# Patient Record
Sex: MALE | Race: WHITE | HISPANIC OR LATINO | Employment: UNEMPLOYED | URBAN - METROPOLITAN AREA
[De-identification: names, ages, dates, MRNs, and addresses within clinical notes are randomized per-mention and may not be internally consistent; named-entity substitution may affect disease eponyms.]

---

## 2020-03-26 ENCOUNTER — HOSPITAL ENCOUNTER (INPATIENT)
Facility: CLINIC | Age: 35
LOS: 6 days | Discharge: HOME | DRG: 885 | End: 2020-04-01
Attending: PSYCHIATRY & NEUROLOGY | Admitting: PSYCHIATRY & NEUROLOGY
Payer: COMMERCIAL

## 2020-03-26 ENCOUNTER — HOSPITAL ENCOUNTER (EMERGENCY)
Facility: CLINIC | Age: 35
Discharge: ANOTHER HOSPITAL | End: 2020-03-26
Attending: PSYCHIATRY & NEUROLOGY | Admitting: PSYCHIATRY & NEUROLOGY
Payer: COMMERCIAL

## 2020-03-26 VITALS
DIASTOLIC BLOOD PRESSURE: 78 MMHG | WEIGHT: 180 LBS | RESPIRATION RATE: 16 BRPM | OXYGEN SATURATION: 98 % | TEMPERATURE: 98.2 F | SYSTOLIC BLOOD PRESSURE: 122 MMHG | HEART RATE: 88 BPM

## 2020-03-26 DIAGNOSIS — F32.2 CURRENT SEVERE EPISODE OF MAJOR DEPRESSIVE DISORDER WITHOUT PSYCHOTIC FEATURES, UNSPECIFIED WHETHER RECURRENT (H): ICD-10-CM

## 2020-03-26 DIAGNOSIS — F33.2 SEVERE EPISODE OF RECURRENT MAJOR DEPRESSIVE DISORDER, WITHOUT PSYCHOTIC FEATURES (H): Primary | ICD-10-CM

## 2020-03-26 PROBLEM — R45.851 SUICIDAL IDEATION: Status: ACTIVE | Noted: 2020-03-26

## 2020-03-26 LAB
AMPHETAMINES UR QL SCN: NEGATIVE
BARBITURATES UR QL: NEGATIVE
BENZODIAZ UR QL: NEGATIVE
CANNABINOIDS UR QL SCN: NEGATIVE
COCAINE UR QL: NEGATIVE
ERYTHROCYTE [DISTWIDTH] IN BLOOD BY AUTOMATED COUNT: 12.4 % (ref 10–15)
ETHANOL UR QL SCN: NEGATIVE
HCT VFR BLD AUTO: 43 % (ref 40–53)
HGB BLD-MCNC: 14.5 G/DL (ref 13.3–17.7)
MCH RBC QN AUTO: 30.2 PG (ref 26.5–33)
MCHC RBC AUTO-ENTMCNC: 33.7 G/DL (ref 31.5–36.5)
MCV RBC AUTO: 90 FL (ref 78–100)
OPIATES UR QL SCN: NEGATIVE
PLATELET # BLD AUTO: 280 10E9/L (ref 150–450)
RBC # BLD AUTO: 4.8 10E12/L (ref 4.4–5.9)
WBC # BLD AUTO: 8.2 10E9/L (ref 4–11)

## 2020-03-26 PROCEDURE — 25000132 ZZH RX MED GY IP 250 OP 250 PS 637: Performed by: PSYCHIATRY & NEUROLOGY

## 2020-03-26 PROCEDURE — 84443 ASSAY THYROID STIM HORMONE: CPT | Performed by: PSYCHIATRY & NEUROLOGY

## 2020-03-26 PROCEDURE — 80048 BASIC METABOLIC PNL TOTAL CA: CPT | Performed by: PSYCHIATRY & NEUROLOGY

## 2020-03-26 PROCEDURE — 85027 COMPLETE CBC AUTOMATED: CPT | Performed by: PSYCHIATRY & NEUROLOGY

## 2020-03-26 PROCEDURE — 12400000 ZZH R&B MH

## 2020-03-26 PROCEDURE — 99285 EMERGENCY DEPT VISIT HI MDM: CPT | Mod: 25 | Performed by: PSYCHIATRY & NEUROLOGY

## 2020-03-26 PROCEDURE — 90791 PSYCH DIAGNOSTIC EVALUATION: CPT

## 2020-03-26 PROCEDURE — 80320 DRUG SCREEN QUANTALCOHOLS: CPT | Performed by: PSYCHIATRY & NEUROLOGY

## 2020-03-26 PROCEDURE — 36415 COLL VENOUS BLD VENIPUNCTURE: CPT | Performed by: PSYCHIATRY & NEUROLOGY

## 2020-03-26 PROCEDURE — 80307 DRUG TEST PRSMV CHEM ANLYZR: CPT | Performed by: PSYCHIATRY & NEUROLOGY

## 2020-03-26 PROCEDURE — 99284 EMERGENCY DEPT VISIT MOD MDM: CPT | Mod: Z6 | Performed by: PSYCHIATRY & NEUROLOGY

## 2020-03-26 RX ORDER — ASPIRIN 81 MG/1
81 TABLET ORAL DAILY
COMMUNITY

## 2020-03-26 RX ORDER — CITALOPRAM HYDROBROMIDE 10 MG/1
10 TABLET ORAL DAILY
Status: ON HOLD | COMMUNITY
End: 2020-04-01

## 2020-03-26 RX ORDER — ASPIRIN 81 MG/1
81 TABLET ORAL DAILY
Status: DISCONTINUED | OUTPATIENT
Start: 2020-03-26 | End: 2020-04-01 | Stop reason: HOSPADM

## 2020-03-26 RX ORDER — ROSUVASTATIN CALCIUM 20 MG/1
20 TABLET, COATED ORAL DAILY
COMMUNITY

## 2020-03-26 RX ORDER — FINASTERIDE 1 MG/1
1 TABLET, FILM COATED ORAL DAILY
COMMUNITY

## 2020-03-26 RX ORDER — CITALOPRAM HYDROBROMIDE 20 MG/1
20 TABLET ORAL DAILY
Status: ON HOLD | COMMUNITY
End: 2020-04-01

## 2020-03-26 RX ORDER — ROSUVASTATIN CALCIUM 20 MG/1
20 TABLET, COATED ORAL DAILY
Status: DISCONTINUED | OUTPATIENT
Start: 2020-03-26 | End: 2020-04-01 | Stop reason: HOSPADM

## 2020-03-26 RX ORDER — CITALOPRAM HYDROBROMIDE 20 MG/1
20 TABLET ORAL DAILY
Status: DISCONTINUED | OUTPATIENT
Start: 2020-03-26 | End: 2020-03-27

## 2020-03-26 RX ORDER — CITALOPRAM HYDROBROMIDE 10 MG/1
10 TABLET ORAL DAILY
Status: DISCONTINUED | OUTPATIENT
Start: 2020-03-26 | End: 2020-03-27

## 2020-03-26 RX ORDER — HYDROXYZINE HYDROCHLORIDE 25 MG/1
25 TABLET, FILM COATED ORAL EVERY 4 HOURS PRN
Status: DISCONTINUED | OUTPATIENT
Start: 2020-03-26 | End: 2020-04-01 | Stop reason: HOSPADM

## 2020-03-26 RX ORDER — FINASTERIDE 1 MG/1
1 TABLET, FILM COATED ORAL DAILY
Status: DISCONTINUED | OUTPATIENT
Start: 2020-03-26 | End: 2020-04-01 | Stop reason: HOSPADM

## 2020-03-26 RX ADMIN — CITALOPRAM HYDROBROMIDE 20 MG: 20 TABLET ORAL at 23:47

## 2020-03-26 RX ADMIN — ROSUVASTATIN CALCIUM 20 MG: 20 TABLET, FILM COATED ORAL at 23:48

## 2020-03-26 RX ADMIN — ASPIRIN 81 MG: 81 TABLET, DELAYED RELEASE ORAL at 23:47

## 2020-03-26 RX ADMIN — CITALOPRAM HYDROBROMIDE 10 MG: 10 TABLET ORAL at 23:48

## 2020-03-26 RX ADMIN — HYDROXYZINE HYDROCHLORIDE 25 MG: 25 TABLET, FILM COATED ORAL at 23:51

## 2020-03-26 RX ADMIN — FINASTERIDE 1 MG: 1 TABLET, FILM COATED ORAL at 23:47

## 2020-03-26 RX ADMIN — BICTEGRAVIR SODIUM, EMTRICITABINE, AND TENOFOVIR ALAFENAMIDE FUMARATE 1 TABLET: 50; 200; 25 TABLET ORAL at 23:48

## 2020-03-26 ASSESSMENT — ENCOUNTER SYMPTOMS
NERVOUS/ANXIOUS: 1
SHORTNESS OF BREATH: 0
DYSPHORIC MOOD: 1
ABDOMINAL PAIN: 0
HALLUCINATIONS: 0
FEVER: 0

## 2020-03-26 ASSESSMENT — ACTIVITIES OF DAILY LIVING (ADL)
SWALLOWING: 0-->SWALLOWS FOODS/LIQUIDS WITHOUT DIFFICULTY
DRESS: 0-->INDEPENDENT
FALL_HISTORY_WITHIN_LAST_SIX_MONTHS: NO
AMBULATION: 0-->INDEPENDENT
BATHING: 0-->INDEPENDENT
RETIRED_EATING: 0-->INDEPENDENT
COGNITION: 0 - NO COGNITION ISSUES REPORTED
RETIRED_COMMUNICATION: 0-->UNDERSTANDS/COMMUNICATES WITHOUT DIFFICULTY
TOILETING: 0-->INDEPENDENT
TRANSFERRING: 0-->INDEPENDENT

## 2020-03-26 ASSESSMENT — MIFFLIN-ST. JEOR: SCORE: 1708.29

## 2020-03-26 NOTE — ED TRIAGE NOTES
"Patient reports multiple psychosocial factors that are contributing in increased SI. \"All of my safety nets are falling apart.\"   "

## 2020-03-26 NOTE — ED NOTES
"Patient presents to ED with c/o suicidal thoughts and states \"I want it to stop and be over; I am tired of suffering and being in pain\"; patient states suicidal thoughts started a couple of days ago and has gotten worst; patient denies alcohol and drug use; denies hallucination; patient is calm, cooperative and sitting up in chair.  "

## 2020-03-26 NOTE — ED NOTES
Chilo from [facility] called to inform ED he is sending pt here for eval. Pt had presented to [facility] today for c/o many stressors, feeling suicidal and needing support. Pt recently broke up with his partner, says he is not coping well, unable to contract for safety. Staff at [facility] say pt too acute for their program. Chilo is available by phone if needed: [phone]

## 2020-03-27 LAB
ANION GAP SERPL CALCULATED.3IONS-SCNC: 5 MMOL/L (ref 3–14)
BUN SERPL-MCNC: 20 MG/DL (ref 7–30)
CALCIUM SERPL-MCNC: 8.8 MG/DL (ref 8.5–10.1)
CHLORIDE SERPL-SCNC: 106 MMOL/L (ref 94–109)
CO2 SERPL-SCNC: 26 MMOL/L (ref 20–32)
CREAT SERPL-MCNC: 0.92 MG/DL (ref 0.66–1.25)
GFR SERPL CREATININE-BSD FRML MDRD: >90 ML/MIN/{1.73_M2}
GLUCOSE SERPL-MCNC: 92 MG/DL (ref 70–99)
POTASSIUM SERPL-SCNC: 3.5 MMOL/L (ref 3.4–5.3)
SODIUM SERPL-SCNC: 137 MMOL/L (ref 133–144)
TSH SERPL DL<=0.005 MIU/L-ACNC: 0.58 MU/L (ref 0.4–4)

## 2020-03-27 PROCEDURE — 99223 1ST HOSP IP/OBS HIGH 75: CPT | Performed by: PHYSICIAN ASSISTANT

## 2020-03-27 PROCEDURE — 12400000 ZZH R&B MH

## 2020-03-27 PROCEDURE — 25000132 ZZH RX MED GY IP 250 OP 250 PS 637: Performed by: PSYCHIATRY & NEUROLOGY

## 2020-03-27 RX ORDER — CITALOPRAM HYDROBROMIDE 20 MG/1
40 TABLET ORAL DAILY
Status: DISCONTINUED | OUTPATIENT
Start: 2020-03-28 | End: 2020-03-27

## 2020-03-27 RX ORDER — ARIPIPRAZOLE 2 MG/1
2 TABLET ORAL DAILY
Status: DISCONTINUED | OUTPATIENT
Start: 2020-03-27 | End: 2020-04-01 | Stop reason: HOSPADM

## 2020-03-27 RX ORDER — CITALOPRAM HYDROBROMIDE 20 MG/1
40 TABLET ORAL AT BEDTIME
Status: DISCONTINUED | OUTPATIENT
Start: 2020-03-27 | End: 2020-03-28

## 2020-03-27 RX ADMIN — ARIPIPRAZOLE 2 MG: 2 TABLET ORAL at 19:25

## 2020-03-27 RX ADMIN — HYDROXYZINE HYDROCHLORIDE 25 MG: 25 TABLET, FILM COATED ORAL at 18:46

## 2020-03-27 RX ADMIN — ASPIRIN 81 MG: 81 TABLET, DELAYED RELEASE ORAL at 22:58

## 2020-03-27 RX ADMIN — FINASTERIDE 1 MG: 1 TABLET, FILM COATED ORAL at 22:58

## 2020-03-27 RX ADMIN — ROSUVASTATIN CALCIUM 20 MG: 20 TABLET, FILM COATED ORAL at 22:58

## 2020-03-27 RX ADMIN — BICTEGRAVIR SODIUM, EMTRICITABINE, AND TENOFOVIR ALAFENAMIDE FUMARATE 1 TABLET: 50; 200; 25 TABLET ORAL at 22:56

## 2020-03-27 ASSESSMENT — ACTIVITIES OF DAILY LIVING (ADL)
ORAL_HYGIENE: INDEPENDENT
HYGIENE/GROOMING: INDEPENDENT
DRESS: INDEPENDENT

## 2020-03-27 NOTE — H&P
[facility]    History and Physical - Hospitalist Service       Date of Admission:  3/26/2020    Assessment & Plan   Rajat Felton is a 34 year old male with a past medical history of HIV, cerebellar CVA, depression, suicidal ideation and attempt in 2006 who presented to the Emergency Department for evaluation of worsening depression, suicidal ideation with a plan. He is admitted under inpatient psychiatry for further treatment.    Depression  Suicidal Ideation with Plan  Pt admits to recent stressors of losing job and break up with significant other within preceding 1-2 weeks. He has had increasing thoughts of suicide with plan to jump off a stairwell. With hx of suicide attempt in 2006. Appeared to be decompensated in ED with hopelessness, helplessness. Does follow with a therapist regularly. Has been compliant with PTA medications.  - Management, treatment per inpatient psychiatry    Hx cerebellar CVA  Migraine HA  06/2019. Workup revealed ASD, negative for arrhythmias or hypercoagulable state. He is s/p ASD repair 09/2019. Completed 6-months of DAPT with plavix/ASA following repair. Has suffered from migraine headaches since, follows with Neurology.  - Continue PTA ASA, crestor, topiramate    HIV  Follows with infectious disease.  - Continue PTA bictegravir-emtricitabine-tenofovir     Diet: Regular Diet Adult    DVT Prophylaxis: Ambulate every shift  Redd Catheter: not present  Code Status: Full Code      Disposition Plan   Expected discharge: per inpatient psychiatry.  Entered: Russ Sharpe PA-C 03/27/2020, 9:03 AM     The patient's care was discussed with the Attending Physician, Dr. Baum, Bedside Nurse and Patient.    Russ Sharpe PA-C  [facility]    ______________________________________________________________________    Chief Complaint   Suicidal ideation    History is obtained from the patient    History of Present Illness   Rajat Felton is a 34 year old male with  a past medical history of HIV, cerebellar CVA, depression, suicidal ideation and attempt in 2006 who presented to the Emergency Department for evaluation of worsening depression, suicidal ideation with a plan.  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 He has had worsening depression and increase in suicidal thoughts. He has recently begun thinking of ways to kill himself, such as jumping off a stairwell. He presented to the ED for evaluation out of concern for self-harm. He was evaluated and determined to be in a decompensated state of depression, recommended to be admitted to inpatient mental health. Hospitalist was contacted for medical H&P.    Pt is presently evaluated in stepdown side of [facility]. On interview, he is withdrawn and makes little eye contact. Reports he feels very sad. Admits to ongoing thoughts of suicide. Appetite has been poor lately. Denies recent fever, chills, cough, sob, URI-like symptoms.     Review of Systems    The 10 point Review of Systems is negative other than noted in the HPI or here.     Past Medical History    I have reviewed this patient's medical history and updated it with pertinent information if needed.   1. Depression  2. Cerebellar CVA  3. Migraine HAs  4. ASD s/p repair  5. HIV  6. HLD    Past Surgical History   I have reviewed this patient's surgical history and updated it with pertinent information if needed.  1. ASD repair    Social History   I have reviewed this patient's social history and updated it with pertinent information if needed.  Social History     Tobacco Use     Smoking status: Not on file   Substance Use Topics     Alcohol use: Not on file     Drug use: Not on file       Family History   I have reviewed this patient's family history and updated it with pertinent information if needed.   Mother with hx HLD, Father with hx brain aneurysm at age 32.    Prior to  Admission Medications   Prior to Admission Medications   Prescriptions Last Dose Informant Patient Reported? Taking?   aspirin 81 MG EC tablet 3/25/2020 at Unknown time  Yes Yes   Sig: Take 81 mg by mouth daily   bictegravir-emtricitabine-tenofovir (BIKTARVY) -25 MG per tablet 3/25/2020 at Unknown time  Yes Yes   Sig: Take 1 tablet by mouth daily   citalopram (CELEXA) 10 MG tablet 3/25/2020 at Unknown time  Yes Yes   Sig: Take 10 mg by mouth daily with 20 mg tablet for a total daily dose of 30 mg   citalopram (CELEXA) 20 MG tablet 3/25/2020 at Unknown time  Yes Yes   Sig: Take 20 mg by mouth daily with 10 mg tablet for a total daily dose of 30 mg   finasteride (PROPECIA) 1 MG tablet 3/25/2020 at Unknown time  Yes Yes   Sig: Take 1 mg by mouth daily   rosuvastatin (CRESTOR) 20 MG tablet 3/25/2020 at Unknown time  Yes Yes   Sig: Take 20 mg by mouth daily      Facility-Administered Medications: None     Allergies   No Known Allergies    Physical Exam   Vital Signs: Temp: 98.4  F (36.9  C) Temp src: Oral BP: 114/76 Pulse: 72   Resp: 16 SpO2: 97 % O2 Device: None (Room air)    Weight: 179 lbs 9.6 oz    GEN: well-developed, well-nourished, appears comfortable  HEENT: NCAT, PERRL, EOM intact bilaterally, sclera clear, conjunctiva normal, nose & mouth patent, mucous membranes moist  CHEST: lungs CTA bilaterally, no increased work of breathing, no wheeze, rales, rhonchi  HEART: RRR, S1 & S2, no murmur  ABD: soft, nontender, nondistended, no guarding or rigidity, +BS in all 4 quadrants  MSK: full AROM bilateral UE/LE, pedal & radial pulses 2+ bilaterally  SKIN: warm & dry without rash, no pedal edema    Data   Data reviewed today: I reviewed all medications, new labs and imaging results over the last 24 hours. I personally reviewed no images or EKG's today.    Recent Labs   Lab 03/26/20  2343   WBC 8.2   HGB 14.5   MCV 90         POTASSIUM 3.5   CHLORIDE 106   CO2 26   BUN 20   CR 0.92   ANIONGAP 5   ART  8.8   GLC 92     No results found for this or any previous visit (from the past 24 hour(s)).

## 2020-03-27 NOTE — PHARMACY-ADMISSION MEDICATION HISTORY
Admission medication history for the March 26, 2020 admission is complete.     Interview Sources:  [facility], [facility]    Reliability of Source: Good   - Patient knew all of his medication names, dosing, and most of the strengths    Medication Adherence: Good    Current Outpatient Pharmacy: [facility] in [place], [place] on [facility]    Changes made to PTA medication list (reason)  Added: Aspirin, Biktarvy, citalopram 10 mg & 20 mg, finasteride, rosuvastatin  Deleted: N/A  Changed: N/A    Additional medication history information:   None    Prior to Admission Medication List:  Prior to Admission medications    Medication Sig Last Dose Taking? Auth Provider   aspirin 81 MG EC tablet Take 81 mg by mouth daily 3/25/2020 Yes Unknown, Entered By History   bictegravir-emtricitabine-tenofovir (BIKTARVY) -25 MG per tablet Take 1 tablet by mouth daily 3/25/2020 Yes Unknown, Entered By History   citalopram (CELEXA) 10 MG tablet Take 10 mg by mouth daily with 20 mg tablet for a total daily dose of 30 mg 3/25/2020 Yes Unknown, Entered By History   citalopram (CELEXA) 20 MG tablet Take 20 mg by mouth daily with 10 mg tablet for a total daily dose of 30 mg 3/25/2020 Yes Unknown, Entered By History   finasteride (PROPECIA) 1 MG tablet Take 1 mg by mouth daily 3/25/2020 Yes Unknown, Entered By History   rosuvastatin (CRESTOR) 20 MG tablet Take 20 mg by mouth daily 3/25/2020 Yes Unknown, Entered By History       Time spent: 15 minutes    Medication history completed by:   Elva Porras - Pharmacy Intern

## 2020-03-27 NOTE — PROGRESS NOTES
"Patient is a 34 year old male with a history of suicidal ideation, received from [facility] for evaluation of suicidal ideation. Patient endorses chronic suicidal ideation because all of his social support is gone. He stated: \"I don't know what I would do if I was left alone.\" Says he has nothing to live for. Patient described contemplating \"taking a knife to my heart or throwing myself down the stairs.\" Patient said he became estranged with his family from [place]. Recent stressors include a break up with his significant other and the loss of his job as an . Patient's mood is hopeless and depressed, with flat and sad affect. Plan is for evaluation by psychiatry.    Nursing assessment complete including patient and medication profiles. Risk assessments completed addressing suicide,fall,skin,nutrition and safety issues. Care plan initiated. Assessments reviewed with physician and admit orders received. Video monitoring in progress, Patient Informed. Welcome packet reviewed with patient. Information reviewed includes getting emergency help, preventing infections, understanding your care, using medication safely, reducing falls, preventing pressure ulcers, smoking cessation, powerful choices and Patients Bill of Rights. Pt. given tour of the unit and instruction on use of facility including emergency call light. Program schedule reviewed with patient. Questions regarding the unit addressed. Pt. Search completed and belongings inventoried.          "

## 2020-03-27 NOTE — PROGRESS NOTES
03/27/20 1400   General Information   Has Not Attended OT as of: 03/27/20     Pt has not attended OT since admit.  Will continue to encourage participation and completion of  self-assessment as able. OT staff will explain the purpose of being involved with treatment plan and provide options to meet current needs and goals.

## 2020-03-27 NOTE — ED NOTES
ED to Behavioral Floor Handoff    SITUATION  Rajat Felton is a 34 year old male who speaks English and lives in a home with others The patient arrived in the ED by private car from home with a complaint of Suicidal  .The patient's current symptoms started/worsened 1 week(s) ago and during this time the symptoms have increased.   In the ED, pt was diagnosed with   Final diagnoses:   Current severe episode of major depressive disorder without psychotic features, unspecified whether recurrent (H)        Initial vitals were: BP: 127/89  Pulse: 94  Temp: 98.2  F (36.8  C)  Resp: 18  Weight: 81.6 kg (180 lb)  SpO2: 97 %   --------  Is the patient diabetic? No   If yes, last blood glucose? --     If yes, was this treated in the ED? --  --------  Is the patient inebriated (ETOH) No or Impaired on other substances? No  MSSA done? N/A  Last MSSA score: --    Were withdrawal symptoms treated? N/A  Does the patient have a seizure history? No. If yes, date of most recent seizure--  --------  Is the patient patient experiencing suicidal ideation? reports the following suicide factors: states that he has no reason to live anymore    Homicidal ideation? denies current or recent homicidal ideation or behaviors.    Self-injurious behavior/urges? denies current or recent self injurious behavior or ideation.  ------  Was pt aggressive in the ED No  Was a code called No  Is the pt now cooperative? Yes  -------  Meds given in ED: Medications - No data to display   Family present during ED course? No  Family currently present? No    BACKGROUND  Does the patient have a cognitive impairment or developmental disability? No  Allergies: No Known Allergies.   Social demographics are   Social History     Socioeconomic History     Marital status: Single     Spouse name: Not on file     Number of children: Not on file     Years of education: Not on file     Highest education level: Not on file   Occupational History     Not on file   Social Needs      Financial resource strain: Not on file     Food insecurity     Worry: Not on file     Inability: Not on file     Transportation needs     Medical: Not on file     Non-medical: Not on file   Tobacco Use     Smoking status: Not on file   Substance and Sexual Activity     Alcohol use: Not on file     Drug use: Not on file     Sexual activity: Not on file   Lifestyle     Physical activity     Days per week: Not on file     Minutes per session: Not on file     Stress: Not on file   Relationships     Social connections     Talks on phone: Not on file     Gets together: Not on file     Attends Orthodoxy service: Not on file     Active member of club or organization: Not on file     Attends meetings of clubs or organizations: Not on file     Relationship status: Not on file     Intimate partner violence     Fear of current or ex partner: Not on file     Emotionally abused: Not on file     Physically abused: Not on file     Forced sexual activity: Not on file   Other Topics Concern     Not on file   Social History Narrative     Not on file        ASSESSMENT  Labs results Labs Ordered and Resulted from Time of ED Arrival Up to the Time of Departure from the ED - No data to display   Imaging Studies: No results found for this or any previous visit (from the past 24 hour(s)).   Most recent vital signs /89   Pulse 94   Temp 98.2  F (36.8  C) (Oral)   Resp 18   Wt 81.6 kg (180 lb)   SpO2 97%    Abnormal labs/tests/findings requiring intervention:---   Pain control: pt had none  Nausea control: pt had none    RECOMMENDATION  Are any infection precautions needed (MRSA, VRE, etc.)? No If yes, what infection? --  ---  Does the patient have mobility issues? independently. If yes, what device does the pt use? ---  ---  Is patient on 72 hour hold or commitment? No If on 72 hour hold, have hold and rights been given to patient? N/A  Are admitting orders written if after 10 p.m. ?N/A  Tasks needing to be completed:---      Phyllis Spring RN   ascom--    [phone] [facility]   [phone] [facility]

## 2020-03-27 NOTE — H&P
"Admitted:     03/26/2020      IDENTIFYING DATA AND REASON FOR REFERRAL:  Rajat Felton, who prefers to be addressed as Jermain, is a 34-year-old single father of none who reports that he works as an  but was just laid off his job in 01/2020, where he got severance and was doing a coding boot camp.  He reports he had a boyfriend up until Friday, when they broke up, having been together for 4 months.  He presented to the crisis residence yesterday, and they felt he needed a higher level of care, so he was transported to the Emergency Department at the [facility], [place] and subsequently brought to the [facility] for admission.  He is admitted as a voluntary patient.  Information was gathered through direct patient contact as well as chart review.      CHIEF COMPLAINT:  \"I think the breakup was the last straw that I could not handle.\"      HISTORY OF PRESENT ILLNESS:  Jermain Calle reports that he moved to [place] about 4 years ago for work.  He reports that things were going well for him in terms of securing employment, buying a house and being financially comfortable.  He worked as an  up until 01/2020.  He states he met his boyfriend 4 months ago, and he felt that he had a connection that he had never experienced before, but feels he may have jeopardized things by unloading too much on his boyfriend, which he believes ultimately led to their breakup this past Friday.  He alleges that he experienced emotional neglect from his mother while growing up and states that this has colored his relationships over the years.  He states everything started spiraling out of control last weekend after the breakup.  He states he decided that he was not in a good place in his mind and therefore stayed with a friend up until yesterday when he decided he needed to end his life to stop the misery he was living in.  He claimed he did not see any point in living " "life anymore and claims \"I have found more solace in dying than living.\"  He reports he has been feeling depressed for a long time and reports that his depressive episodes are typically defined by anhedonia, lack of movement, sadness, and not finding any way of soothing himself.  He reports that he experiences poor appetite, increased anxiety with ruminative worry as well as annoyance at having to rely on himself 100% of the time.  He denies that he has ever experienced lacho or psychosis.  He denies history consistent with eating disorders and does not endorse history suggestive of panic attacks, obsessions, compulsions or PTSD.  Information gathered by the DEC  suggests that the patient was at [facility].  He reportedly presented to the ED with a friend for increased symptoms of depression with suicidal urges and thoughts.  He reported a history of depression \"on and off all my life\" and endorsed past treatment seeing counselors, with a suicide attempt by drug overdose in 2006.  He was started on Celexa by his primary care physician over a year ago, which he continues to take at 30 mg daily.  He reports that a month ago, things seem to be going okay, so it was agreed to go back to 20 mg, but he changed back to 30 mg this past week on account of worsening symptoms.      PAST PSYCHIATRIC HISTORY:  As described in the History of Present Illness.  The patient sees a therapist named Clara Loyd, who is in private practice in [place].  He reports that he is working on abandonment issues he suffered as a child.      CHEMICAL USE HISTORY:  None reported.      PAST MEDICAL HISTORY:   1.  Cerebellar CVA.   2.  Migraines.   3.  Atrial septal defect.   4.  Asymptomatic HIV positive.   5.  Hyperlipidemia.  He is managed by Elyse Pichardo at [facility] in [place].      PAST SURGICAL HISTORY:   1.  Appendectomy.   2.  Repair of the atrial septal defect.      ALLERGIES:  " NO KNOWN DRUG ALLERGIES.      MEDICATIONS PRIOR TO ADMISSION:   1.  Aspirin 81 mg p.o. daily    2.  Biktarvy 1 tablet p.o. daily.   3.  Celexa 30 mg p.o. daily.   4.  Propecia 1 mg p.o. daily.   5.  Crestor 20 mg p.o. daily.      FAMILY PSYCHIATRIC HISTORY:  The patient queries a diagnosis of bipolar disorder in his mother.      SOCIAL HISTORY:  The patient reports he was born and raised in [place].  He states he has 3 half-sisters from his mother's subsequent marriage.  He states his biological father  before he was born, and his mother remarried.  He alleges that he was neglected by his mother, who never loved him, and also alleges that he endured teasing in middle and high schools for being pickard until he ultimately came out at age of 17.  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  He denies  or criminal history.  He has never been  and has no children.  He identifies as pickard.      REVIEW OF SYSTEMS:  A 10-point review of systems was negative apart from the pertinent positives in the history of present illness.      VITAL SIGNS:  Blood pressure 124/74, pulse 95, respirations 16, temperature 99, weight 179 pounds.      MENTAL STATUS EXAMINATION:  This is a young man who appears stated age of 34.  He is seen at his bedside, where he is lying in bed on his side, dressed in hospital scrubs and covering himself up to his neck with blankets.  He makes fair eye contact and speaks very softly, but clearly and coherently.  His mood is profoundly depressed, and his affect is flat and restricted in range.  The thought process is logical, relevant and goal-directed.  He does not endorse the presence of auditory or visual hallucinations.  There are no delusions elicited.  His gait and station are within normal limits.  His muscle strength is adequate.  His associations are tight.   His language is appropriate.  His recall of recent and remote events is intact.  He displays fair insight and judgment.  Risk assessment at this time is considered moderate.  The patient displays fair impulse control.      DIAGNOSTIC IMPRESSION:  Jermain Cordova is a 34-year-old single father of none who identifies as pickard, who broke up with his partner a week ago and has since been experiencing a significant escalation in his depressive symptoms.  He increased his antidepressant dose but did not obtain any desired benefit and could not guarantee safety, hence his presentation to the hospital for stabilization.      ADMITTING DIAGNOSES:   1.  Major depressive disorder, recurrent, severe without psychotic features.   2.  Adjustment disorder with mixed anxiety and depressed mood.   3.  Dependent personality disorder.   4.  History of cerebrovascular accident.   5.  Migraines.   6.  Asymptomatic human immunodeficiency virus-positive.   7.  Hyperlipidemia.      PLAN:  The patient will be maintained in the step-down unit.  Staff will provide a safe environment for him.  He will be encouraged to ventilate his stressors and participate in individual, milieu and group therapy.  He will be offered citalopram at 40 mg daily, while Abilify will be added to his regimen at 2 mg daily to augment his current antidepressant dosing.  Estimated length of stay 3-5 days.         JOSE TSANG MD             D: 2020   T: 2020   MT: AUSTEN      Name:     ROGELIO CORDOVA   MRN:      [id number]        Account:      [id number]   :      1985        Admitted:     2020                   Document: [id number]       cc: Elyse Pichardo MD

## 2020-03-27 NOTE — PROGRESS NOTES
"Case Management Social History    This information has been obtained from patient's chart and through a personal interview with patient. Patient is deemed to be a reliable historian.     Reason for Admission:  Rajat \"Jermain\" Purnima is a 34 year old single male admitted to [facility] on 03/26/2020. He is currently hospitalized voluntarily. He states that he presented to a crisis residence yesterday and they felt that he needed a higher level of care, so he was transported to the Emergency Department at [facility] and then subsequently transferred to [facility]. He states that he has been very sad and hopeless. He believes that things will not get any better, so the only way to stop the suffering is to stop living. He states that he continues to have thoughts of suicide, but he contracts for safety at this time. He states that he has multiple stressors which have contributed to his current feelings of suicide. He feels that he has not fully processed the stroke that he had in June 2019. 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 He states that he recently ended his relationship with his extended family in [place]. His boyfriend of four months broke up with him on Friday.       Previous Mental & Chemical Dependency History:  This is his first mental health hospitalization. He states that he was seen in an emergency room only following a suicide attempt in 2006. He has no previous history of ECT or civil commitment.     He states that he used to drink beverages with caffeine, but he has not consumed any caffeine recently. He states that he quit smoking cigarettes three weeks ago, but then smoked two packs on Saturday following the break up with his boyfriend. He states that he plans to quit again and not smoke anymore. He drinks alcohol socially. He denies any issues with gambling or illicit drug " "usage. He has no past history of chemical dependency treatment or a DUI.       Social History: He is single and has never been . He does not have any children. He was born and raised in [place]. His father was  prior to his birth. He states that he has severed ties with all of his family in [place].       Significant Life Events: There is a history of childhood trauma which he did not elaborate on. He had a previous suicide attempt in .        Restoration: He does not identify with any particular Amish or spiritual orientation. He does not wish to see a  at this time.        History: He has no history of  service.       Education and Work History:  He graduated from high school in [place] in . He attended [employer] where he majored in music. He states that due to the recession at the time, he was unable to finish college. 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       Living Situation: He lives alone in a house in [place].       Financial Status/Stressors:  He does not currently receive social security or disability income.       Medication Coverage: Unknown at this time.       Insurance:  He has [facility] for insurance. He states that his insurance is through COBRA at the moment.       Legal Issues:  He denies any current legal issues,. He is his own guardian.       Community Resources: His primary care provider is Elyse Pichardo at [facility] in [facility]. He does not currently have a psychiatrist. He sees Clara Guidry MSKAYA, LADC, LICSW for therapy. He indicated that she is in private practice in [place].  He does not currently have a [facility] . He is able to drive to all appointments.       Social Functioning:  When asked what he enjoys doing, his reply was \"I don't know anymore.\"         Discharge " Considerations: Case Management will remain available to assist with any discharge needs. He is open to all options for follow up care, including psychiatry referral or even an IOP.

## 2020-03-27 NOTE — ED NOTES
[facility] notified of need for transport. Will arrive in approximately 45 minutes

## 2020-03-27 NOTE — PLAN OF CARE
Shift Update: Pt mood is depressed. Thought process is intact. Insight is poor. Pt denies suicidal ideation. 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 He feels abandoned by his boyfriend and family  as well. He did eat lunch. And is slightly better after this and is making a phone call to a friend at 1500.

## 2020-03-27 NOTE — ED PROVIDER NOTES
ED Provider Note  [facility]      History     Chief Complaint   Patient presents with     Suicidal     The history is provided by the patient and medical records.     Rajat Felton is a 34 year old male who comes in due to his worsening depression.  He has been struggling with depression for some time. He has had chronic suicidal thoughts.  This has gotten worse as his significant other broke up with him last week. He has started to think of ways to kill himself and is thinking of jumping off the stairwell. He has overdosed in the past needing a few days on the medical bains prior to going to mental health.  He is tearful, hopeless and helpless. His suicide attempt was also after a break up.  He denies drug use.    Please see the 's assessment in EPIC from today (3/26/20) for further details.    Past Medical History  No past medical history on file.  No past surgical history on file.  aspirin 81 MG EC tablet  bictegravir-emtricitabine-tenofovir (BIKTARVY) -25 MG per tablet  citalopram (CELEXA) 10 MG tablet  citalopram (CELEXA) 20 MG tablet  finasteride (PROPECIA) 1 MG tablet  rosuvastatin (CRESTOR) 20 MG tablet      No Known Allergies  Past medical history, past surgical history, medications, and allergies were reviewed with the patient. Additional pertinent items: None    Family History  No family history on file.  Family history was reviewed with the patient. Additional pertinent items: None    Social History  Social History     Tobacco Use     Smoking status: Not on file   Substance Use Topics     Alcohol use: Not on file     Drug use: Not on file      Social history was reviewed with the patient. Additional pertinent items: None    Review of Systems   Constitutional: Negative for fever.   Respiratory: Negative for shortness of breath.    Cardiovascular: Negative for chest pain.   Gastrointestinal: Negative for abdominal pain.   Psychiatric/Behavioral: Positive for  dysphoric mood and suicidal ideas. Negative for hallucinations and self-injury. The patient is nervous/anxious.    All other systems reviewed and are negative.    A complete review of systems was performed with pertinent positives and negatives noted in the HPI, and all other systems negative.    Physical Exam   BP: 127/89  Pulse: 94  Temp: 98.2  F (36.8  C)  Resp: 18  Weight: 81.6 kg (180 lb)  SpO2: 97 %  Physical Exam  Vitals signs and nursing note reviewed.   Constitutional:       Appearance: Normal appearance. He is well-developed.   Cardiovascular:      Rate and Rhythm: Normal rate and regular rhythm.      Heart sounds: Normal heart sounds.   Pulmonary:      Effort: Pulmonary effort is normal. No respiratory distress.      Breath sounds: Normal breath sounds.   Neurological:      Mental Status: He is alert and oriented to person, place, and time.   Psychiatric:         Attention and Perception: Attention and perception normal.         Mood and Affect: Mood is depressed. Affect is tearful.         Speech: Speech normal.         Behavior: Behavior is slowed and withdrawn. Behavior is cooperative.         Thought Content: Thought content is not paranoid or delusional. Thought content includes suicidal ideation. Thought content does not include homicidal ideation. Thought content includes suicidal plan. Thought content does not include homicidal plan.         Cognition and Memory: Cognition and memory normal.         Judgment: Judgment normal.      Comments: Jermain is a 33 y/o male who looks his age. He is well groomed with good eye contact.          ED Course      Procedures             No results found for any visits on 03/26/20.  Medications - No data to display     Assessments & Plan (with Medical Decision Making)   Jermain will be admitted to the hospital due to his worsening depression, hopelessness and suicidal thoughts with a plan (and past attempt).  He will go to [facility] under Dr. Clarke.  EMTALA forms  filled out.      I have reviewed the nursing notes. I have reviewed the findings, diagnosis, plan and need for follow up with the patient.    New Prescriptions    No medications on file       Final diagnoses:   Current severe episode of major depressive disorder without psychotic features, unspecified whether recurrent (H)       --  Vinny Rosas MD   Emergency Medicine   [facility], [place], EMERGENCY DEPARTMENT  3/26/2020     Vinny Rosas MD  03/26/20 2047

## 2020-03-27 NOTE — PROGRESS NOTES
03/26/20 2311   Patient Belongings   Did you bring any home meds/supplements to the hospital?  No   Patient Belongings locker   Patient Belongings Put in Hospital Secure Location (Security or Locker, etc.) glasses;cell phone/electronics   Belongings Search Yes   Clothing Search Yes   Second Staff Giovani   Comment Pt belongings searched and placed in pt locker        Pt belongings  Polo shirt  T-shirt x3  Boxers x6  Card henriquez   nicorette   Rubber ball   toiletries   Cell phone  Underwear  Back packx2  vape pen   Mace  Neosporin   Screwdriver   USB cords x3  Pens and markers   Apple Pencil   Mints   Key fob with 4 keys   Pocket knife   Pokemon GO wrist band   Macbook Pro   Water bottle   3 books   Journal with string   Computer    Shorts with string x2  Shoes no laces   Hooded sweater with no strings  Dog collar   $45 cash   [place] state ID card  [place] drivers license (invalid)  Tylenol   Citalopram  HBR 20mg  Citalopram HBR 10mg  Rosuvastatin 20mg   Biktarvy 50mg   Unidentified meds container (for hair)     Security #[id number]   joes gift card  Passport card   Red card [id number]   [id number]  Apple Card    [id number]  AMEX [id number]  VISA [id number]                         Admission:  I am responsible for any personal items that are not sent to the safe or pharmacy.  [facility] is not responsible for loss, theft or damage of any property in my possession.    Signature:  _________________________________ Date: _______  Time: _____                                              Staff Signature:  ____________________________ Date: ________  Time: _____      2nd Staff person, if patient is unable/unwilling to sign:    Signature: ________________________________ Date: ________  Time: _____     Discharge:  [facility] has returned all of my personal belongings:    Signature: _________________________________ Date: ________  Time: _____                                          Staff Signature:  ____________________________ Date: ________   Time: _____

## 2020-03-27 NOTE — PLAN OF CARE
BEHAVIORAL TEAM DISCUSSION    Participants: MD, RN, CM, PA, OT   Progress: No change  Anticipated length of stay: 3-5 days  Continued Stay Criteria/Rationale: Psychiatric stabilization. Medication adjustment.   Medical/Physical: Per hospitalist consult  Precautions:   Behavioral Orders   Procedures    Code 1 - Restrict to Unit    Routine Programming     As clinically indicated    Status 15     Every 15 minutes.     Plan: Psychiatrist to adjust patient's medications for stabilization.   Rationale for change in precautions or plan: Continued stabilization.

## 2020-03-28 PROCEDURE — 25000132 ZZH RX MED GY IP 250 OP 250 PS 637: Performed by: PSYCHIATRY & NEUROLOGY

## 2020-03-28 PROCEDURE — 12400000 ZZH R&B MH

## 2020-03-28 RX ORDER — CITALOPRAM HYDROBROMIDE 20 MG/1
40 TABLET ORAL DAILY
Status: DISCONTINUED | OUTPATIENT
Start: 2020-03-29 | End: 2020-04-01 | Stop reason: HOSPADM

## 2020-03-28 RX ADMIN — ROSUVASTATIN CALCIUM 20 MG: 20 TABLET, FILM COATED ORAL at 22:10

## 2020-03-28 RX ADMIN — ARIPIPRAZOLE 2 MG: 2 TABLET ORAL at 12:20

## 2020-03-28 RX ADMIN — FINASTERIDE 1 MG: 1 TABLET, FILM COATED ORAL at 22:10

## 2020-03-28 RX ADMIN — BICTEGRAVIR SODIUM, EMTRICITABINE, AND TENOFOVIR ALAFENAMIDE FUMARATE 1 TABLET: 50; 200; 25 TABLET ORAL at 22:11

## 2020-03-28 RX ADMIN — CITALOPRAM HYDROBROMIDE 40 MG: 20 TABLET ORAL at 01:02

## 2020-03-28 RX ADMIN — ASPIRIN 81 MG: 81 TABLET, DELAYED RELEASE ORAL at 22:10

## 2020-03-28 RX ADMIN — CITALOPRAM HYDROBROMIDE 40 MG: 20 TABLET ORAL at 12:21

## 2020-03-28 ASSESSMENT — ACTIVITIES OF DAILY LIVING (ADL)
ORAL_HYGIENE: INDEPENDENT
HYGIENE/GROOMING: INDEPENDENT
DRESS: INDEPENDENT
LAUNDRY: WITH SUPERVISION
HYGIENE/GROOMING: INDEPENDENT
DRESS: INDEPENDENT
LAUNDRY: WITH SUPERVISION
HYGIENE/GROOMING: INDEPENDENT
DRESS: INDEPENDENT
ORAL_HYGIENE: INDEPENDENT
ORAL_HYGIENE: INDEPENDENT

## 2020-03-28 NOTE — PLAN OF CARE
Patient was monitored via in person 15 minute checks and appeared asleep throughout the shift. No safety concerns noted. Will continue to monitor.

## 2020-03-28 NOTE — PLAN OF CARE
"Pt spent most of the shift in bed resting. He stated he attempted eating breakfast in the lounge and was overcome with intense anxiety, and decided to return to his room. Pt complains of feeling \"different\" than yesterday. Pt was very tearful throughout the day, reported feeling \"frustrated\" by his medication changes, experiencing constant triggers of past trauma. He declines having SI/B today. Appears isolative, anxious, depressed, poor eye contact. He was compliant with medications and nursing cares.   "

## 2020-03-28 NOTE — PLAN OF CARE
During the evening shift pt was very tearful and remained isolated to his room. Upon encouragement he was present in the milieu for a short duration of time. Hi affect was sad and his mood was depressed. Per pt his major stressors are a recent breakup reporting that it triggered childhood trauma from his past. He reported after receiving medication he has been feeling better and that he hopes that the medication will help with his current state.

## 2020-03-29 PROCEDURE — 25000132 ZZH RX MED GY IP 250 OP 250 PS 637: Performed by: PSYCHIATRY & NEUROLOGY

## 2020-03-29 PROCEDURE — 12400000 ZZH R&B MH

## 2020-03-29 RX ADMIN — ASPIRIN 81 MG: 81 TABLET, DELAYED RELEASE ORAL at 22:22

## 2020-03-29 RX ADMIN — ROSUVASTATIN CALCIUM 20 MG: 20 TABLET, FILM COATED ORAL at 22:22

## 2020-03-29 RX ADMIN — FINASTERIDE 1 MG: 1 TABLET, FILM COATED ORAL at 22:23

## 2020-03-29 RX ADMIN — HYDROXYZINE HYDROCHLORIDE 25 MG: 25 TABLET, FILM COATED ORAL at 03:34

## 2020-03-29 RX ADMIN — BICTEGRAVIR SODIUM, EMTRICITABINE, AND TENOFOVIR ALAFENAMIDE FUMARATE 1 TABLET: 50; 200; 25 TABLET ORAL at 22:25

## 2020-03-29 RX ADMIN — CITALOPRAM HYDROBROMIDE 40 MG: 20 TABLET ORAL at 08:44

## 2020-03-29 RX ADMIN — ARIPIPRAZOLE 2 MG: 2 TABLET ORAL at 08:44

## 2020-03-29 ASSESSMENT — MIFFLIN-ST. JEOR: SCORE: 1691.05

## 2020-03-29 ASSESSMENT — ACTIVITIES OF DAILY LIVING (ADL)
DRESS: INDEPENDENT
ORAL_HYGIENE: INDEPENDENT
ORAL_HYGIENE: INDEPENDENT
HYGIENE/GROOMING: INDEPENDENT
LAUNDRY: WITH SUPERVISION
DRESS: INDEPENDENT
LAUNDRY: WITH SUPERVISION
HYGIENE/GROOMING: INDEPENDENT

## 2020-03-29 NOTE — PLAN OF CARE
Patient was monitored via in-person 15 minute checks and appeared asleep throughout the shift. No safety concerns noted Will continue to monitor.

## 2020-03-29 NOTE — PLAN OF CARE
Patient mostly isolative this shift, pleasant and cooperative. Ate dinner with peers. During staff check in patient said he still feels he would be unsafe due to his depression if he were to discharge to home. Patient claims he wants to experience unconditional love & this is difficult to find for him. Patient claims his therapist has been helpful with processing his issues from childhood but he may want to try a new kind of therapy. Patient also wants to feel an authentic connection with an outpatient psychiatrist, someone who will listen. Endorses fleeting thoughts of suicide but contracts for safety.

## 2020-03-29 NOTE — PLAN OF CARE
"  Problem: Suicidal Behavior  Goal: Suicidal Behavior is Absent or Managed  Outcome: No Change     Pt appeared withdrawn today. He spent the entire shift in bed, mostly napping, up briefly for a phone call. He declined to get up for meals. When asked about his mood, he stated, \"Just blank and numb. Tired.\" He c/o waking up intermittently with stiffness and \"body twitches.\" He was pleasant upon approach and interaction. He denies SI/B.   "

## 2020-03-30 PROCEDURE — 25000132 ZZH RX MED GY IP 250 OP 250 PS 637: Performed by: PSYCHIATRY & NEUROLOGY

## 2020-03-30 PROCEDURE — 12400000 ZZH R&B MH

## 2020-03-30 RX ORDER — IBUPROFEN 200 MG
200 TABLET ORAL EVERY 6 HOURS PRN
Status: DISCONTINUED | OUTPATIENT
Start: 2020-03-30 | End: 2020-04-01 | Stop reason: HOSPADM

## 2020-03-30 RX ADMIN — HYDROXYZINE HYDROCHLORIDE 25 MG: 25 TABLET, FILM COATED ORAL at 22:30

## 2020-03-30 RX ADMIN — ASPIRIN 81 MG: 81 TABLET, DELAYED RELEASE ORAL at 21:17

## 2020-03-30 RX ADMIN — IBUPROFEN 200 MG: 200 TABLET, FILM COATED ORAL at 14:18

## 2020-03-30 RX ADMIN — ROSUVASTATIN CALCIUM 20 MG: 20 TABLET, FILM COATED ORAL at 21:17

## 2020-03-30 RX ADMIN — ARIPIPRAZOLE 2 MG: 2 TABLET ORAL at 08:23

## 2020-03-30 RX ADMIN — CITALOPRAM HYDROBROMIDE 40 MG: 20 TABLET ORAL at 08:23

## 2020-03-30 RX ADMIN — FINASTERIDE 1 MG: 1 TABLET, FILM COATED ORAL at 21:17

## 2020-03-30 RX ADMIN — BICTEGRAVIR SODIUM, EMTRICITABINE, AND TENOFOVIR ALAFENAMIDE FUMARATE 1 TABLET: 50; 200; 25 TABLET ORAL at 21:17

## 2020-03-30 ASSESSMENT — ACTIVITIES OF DAILY LIVING (ADL)
ORAL_HYGIENE: INDEPENDENT
DRESS: INDEPENDENT
HYGIENE/GROOMING: INDEPENDENT
LAUNDRY: WITH SUPERVISION

## 2020-03-30 NOTE — PROGRESS NOTES
"Patient was isolative and withdrawn. Patient denies any suicidal ideation and states that he is doing \"fine.\" Patient is guarded in conversation with staff. Patient was medication compliant. Patient stated that his sleep was poor since he was restless. Patient appears tired and depressed but brightens upon approach. Patient did not attend groups.   "

## 2020-03-30 NOTE — PROGRESS NOTES
[facility] Psychiatric Progress Note      Interval History:   Pt seen, team meeting held with , nursing staff, OT, and PA's to review diagnosis and treatment plan.  Staff reported the patient has appeared withdrawn and flat for most of the weekend.  He has been medication compliant and has been sleeping well.  On direct interview the patient reports that he is no longer feeling anxious about ending his life.  He states he does not have any current self-harm thoughts or plans but wonders what will happen to him when he gets out of the hospital.  He states he has been ruminating about his childhood trauma and wonders how that would change while he is here in the hospital.  He also expressed some concern around medication side effects particularly as it pertains to sexual side effects.  He claimed that he experienced significant problems with maintaining an erection and achieving climax when his dose of Celexa went from 20 to 30 mg and now worries that at the addition of Abilify may make this worse.  The potential side effects associated with the combination of Abilify and Celexa were discussed with him and he verbalized understanding.  He was educated on the potential benefits of dialectical behavioral therapy given his dependent personality traits and he expressed interest.  He requested information about DBT and he was advised that he will be provided with this.     Review of systems:   The Review of Systems completed by Nadeem Clarke MD is negative other than noted in the HPI     Medications:       ARIPiprazole  2 mg Oral Daily     aspirin  81 mg Oral Daily     bictegravir-emtricitabine-tenofovir  1 tablet Oral Daily     citalopram  40 mg Oral Daily     finasteride  1 mg Oral Daily     rosuvastatin  20 mg Oral Daily     hydrOXYzine    Mental Status Examination:     Appearance:  awake, alert, dressed in hospital scrubs and appeared as age stated  Attitude:  cooperative  Eye  "Contact:  fair  Mood:  sad  and depressed  Affect:  mood congruent and intensity is flat  Speech:  clear, coherent and normal prosody  Psychomotor Behavior:  no evidence of tardive dyskinesia, dystonia, or tics and intact station, gait and muscle tone  Thought Process:  logical, linear and goal oriented  Associations:  no loose associations  Thought Content:  no evidence of suicidal ideation or homicidal ideation and no evidence of psychotic thought  Insight:  fair  Judgment:  fair  Oriented to:  time, person, and place  Attention Span and Concentration:  fair  Recent and Remote Memory:  intact  Language: Able to name objects, Able to repeat phrases and Able to read and write  Fund of Knowledge: appropriate  Muscle Strength and Tone: normal  Gait and Station: Normal          Labs/Vitals:   /74   Pulse 74   Temp 98.4  F (36.9  C) (Oral)   Resp 16   Ht 1.702 m (5' 7\")   Wt 79.7 kg (175 lb 12.8 oz)   SpO2 97%   BMI 27.53 kg/m    No results found for this or any previous visit (from the past 24 hour(s)).    Impression:   Jermain Felton is a 34-year-old single father of none who identifies as pickard, who broke up with his partner a week ago and has since been experiencing a significant escalation in his depressive symptoms.  He increased his antidepressant dose but did not obtain any desired benefit and could not guarantee safety, hence his presentation to the hospital for stabilization.       DIagnoses:     1.  Major depressive disorder, recurrent, severe without psychotic features.   2.  Adjustment disorder with mixed anxiety and depressed mood.   3.  Dependent personality disorder.   4.  History of cerebrovascular accident.   5.  Migraines.   6.  Asymptomatic human immunodeficiency virus-positive.   7.  Hyperlipidemia.            Plan:   1. Written information given on medications. Side effects, risks, benefits reviewed.  2. Medication changes: None.  3. Legal Status: Voluntary  4.  Continue hospitalization on " stepdown unit      Attestation:  Patient has been seen and evaluated by me, Nadeem Clarke MD today.    PATIENT ID  Name: Rajat Felton  MRN:[id number]  YOB: 1985

## 2020-03-30 NOTE — PLAN OF CARE
BEHAVIORAL TEAM DISCUSSION    Participants: MD, RN, CM, PA, OT   Progress: Improving   Anticipated length of stay: Unknown. Until psychiatrically stable.   Continued Stay Criteria/Rationale:  Continued medication adjustment.   Medical/Physical: Per hospitalist consult  Precautions:   Behavioral Orders   Procedures    Code 1 - Restrict to Unit    Routine Programming     As clinically indicated    Status 15     Every 15 minutes.     Plan: Continue current medications. Refer to DBT at discharge.     Rationale for change in precautions or plan: Continued stabilization.

## 2020-03-30 NOTE — PLAN OF CARE
"Pt presents with flat blunt affect and calm mood. Pt is withdrawn from peers and stayed in his room for a majority of the shift. When asked how he is doing he states \"fine\" and doesn't easily open up/is reserved. Pt appears internally stimulated. Med compliant, no SI stated.  "

## 2020-03-31 PROCEDURE — 25000132 ZZH RX MED GY IP 250 OP 250 PS 637: Performed by: PSYCHIATRY & NEUROLOGY

## 2020-03-31 PROCEDURE — 12400000 ZZH R&B MH

## 2020-03-31 RX ORDER — HYDROXYZINE HYDROCHLORIDE 50 MG/ML
50 INJECTION, SOLUTION INTRAMUSCULAR AT BEDTIME
Status: DISCONTINUED | OUTPATIENT
Start: 2020-03-31 | End: 2020-03-31 | Stop reason: CLARIF

## 2020-03-31 RX ORDER — HYDROXYZINE HYDROCHLORIDE 50 MG/1
50 TABLET, FILM COATED ORAL AT BEDTIME
Status: DISCONTINUED | OUTPATIENT
Start: 2020-03-31 | End: 2020-04-01 | Stop reason: HOSPADM

## 2020-03-31 RX ADMIN — CITALOPRAM HYDROBROMIDE 40 MG: 20 TABLET ORAL at 08:44

## 2020-03-31 RX ADMIN — ARIPIPRAZOLE 2 MG: 2 TABLET ORAL at 08:44

## 2020-03-31 RX ADMIN — FINASTERIDE 1 MG: 1 TABLET, FILM COATED ORAL at 20:51

## 2020-03-31 RX ADMIN — ASPIRIN 81 MG: 81 TABLET, DELAYED RELEASE ORAL at 20:51

## 2020-03-31 RX ADMIN — ROSUVASTATIN CALCIUM 20 MG: 20 TABLET, FILM COATED ORAL at 20:51

## 2020-03-31 RX ADMIN — HYDROXYZINE HYDROCHLORIDE 50 MG: 50 TABLET, FILM COATED ORAL at 20:51

## 2020-03-31 RX ADMIN — BICTEGRAVIR SODIUM, EMTRICITABINE, AND TENOFOVIR ALAFENAMIDE FUMARATE 1 TABLET: 50; 200; 25 TABLET ORAL at 20:47

## 2020-03-31 ASSESSMENT — MIFFLIN-ST. JEOR: SCORE: 1707.84

## 2020-03-31 NOTE — DISCHARGE INSTRUCTIONS
Behavioral Discharge Planning and Instructions    Summary:  Admitted with suicidal ideation.     Main Diagnosis:    1.  Major Depressive Disorder, recurrent, severe without psychotic features.   2.  Adjustment Disorder with mixed anxiety and depressed mood.   3.  Dependent Personality Disorder.   4.  History of cerebrovascular accident.   5.  Migraines.   6.  Asymptomatic human immunodeficiency virus-positive.   7.  Hyperlipidemia.     Major Treatments, Procedures and Findings: Psychiatric assessment. Medication adjustment.     Symptoms to Report: Losing more sleep, Mood getting worse or Thoughts of suicide    Lifestyle Adjustment:  Follow all treatment recommendations. Develop and follow safety plan. Your safety plan is your contract with yourself to keep you safe in a crisis. Be sure to use the resources and crisis numbers listed on your safety plan.     Psychiatry Follow-up:     You have an intake appointment for Dialectical Behavioral Therapy (DBT) at [facility] in [place] on Thursday, April 2, 2020 at 1:00 pm. This appointment will actually be on-site at [facility] in [place]. Please bring your insurance card, photo ID and a list of medications with you to this appointment.     [address], [address]  [place] [address]  [phone] / [phone] fax     You have an appointment for medication management with Jen Arrington MS CNP at [facility] in [place] on Tuesday, April 7, 2020 at 11:00 am. PLEASE NOTE: This appointment will be by phone only. The provider will call you at your appointment time, so please answer your phone. It is important that you keep this appointment so that you can get your medications refilled.     [facility] - [address] [address]  [phone] / [phone] fax    Resources:   Crisis Intervention: 854.125.3879 or [phone] (TTY: 675.257.5420).   Call anytime for help.  National [place] [employer] Mental Illness (www.[place].hansa.org): 465.444.5984 or 288-515-0294.  Suicide Awareness Voices of Education (SAVE) (www.save.org): 591-407-SAVE (8949)  COPE (Crisis Services for Adults) in [facility]: [phone] (Available 24/7)    General Medication Instructions:   See your medication sheet(s) for instructions.   Take all medicines as directed.  Make no changes unless your doctor suggests them.   Go to all your doctor visits.  Be sure to have all your required lab tests. This way, your medicines can be refilled on time.  Do not use any drugs not prescribed by your doctor.  Avoid alcohol.    Thank you for choosing [facility]. The treatment team has appreciated the opportunity to work with you. If you have any questions following discharge, you can call [address] at [phone] or you can reach out to your outpatient provider.

## 2020-03-31 NOTE — PLAN OF CARE
"Shift Update: Pt mood is calm, depressed, affect is sad. Thought process is intact. Insight is appropriate. Pt denies suicidal ideation, was med and food compliant, attended a group. Pt reported that he definitely feels better and that he doesn't want to \"unplug and be done\" anymore. Pt is a little more hopeful and is looking forward to \"rely on [himself] for happiness\" through therapy.  "

## 2020-03-31 NOTE — PLAN OF CARE
Problem: Depressive Symptoms  Goal: Depressive Symptoms  Description: Signs and symptoms of listed problems will be absent or manageable.  Outcome: Improving  Flowsheets (Taken 3/31/2020 1406)  Depressive Symptoms Assessed: all  Depressive Symptoms Present:   affect   mood  Note: Pt presents with a blunt affect and a depressed mood. Pt spent the majority of the shift in his room resting. Pt stated that he did not get much sleep last night so he wanted to make up for it today. Pt is hoping that starting on trazodone tonight will help him sleep better. Pt said that he might discharge tomorrow and feels ready for it because he feels bored here. Pt declined all unit programming to rest. Pt ate all of his food and was med compliant. Pt denies Si.

## 2020-03-31 NOTE — PROGRESS NOTES
[facility] Psychiatric Progress Note      Interval History:   Pt seen, team meeting held with , nursing staff, OT, and PA's to review diagnosis and treatment plan.  Staff reported the patient slept uneventfully.  He has remained isolative and withdrawn but denies experiencing any self-harm thoughts or plans.  He has been somewhat guarded and tentative in responding to queries.  On direct interview today he reports that his sleep is poor and he feels somewhat restless.  He reports he was using some supplements for sleep prior to admission but could not recall the names of the medications.  He remains open to pursuing dialectical behavioral therapy.  His case was staffed with the treatment team and the  reports that she verify that [facility] Is not accepting patients until April 13 when the plan to resume clinical activities.  She plans on discussing this with the patient.  With respect to discharge plans, the patient reports that he feels unduly tired today and is hoping to get 1 good night of sleep before he transitions to outpatient care.  We discussed available options and he agreed to try Vistaril at bedtime with a plan to discharge him tomorrow.     Review of systems:   The Review of Systems completed by Nadeem Clarke MD is negative other than noted in the HPI     Medications:       ARIPiprazole  2 mg Oral Daily     aspirin  81 mg Oral Daily     bictegravir-emtricitabine-tenofovir  1 tablet Oral Daily     citalopram  40 mg Oral Daily     finasteride  1 mg Oral Daily     rosuvastatin  20 mg Oral Daily     hydrOXYzine, ibuprofen    Mental Status Examination:     Appearance:  awake, alert, dressed in hospital scrubs and appeared as age stated  Attitude:  cooperative  Eye Contact:  fair  Mood:  Tired  Affect:  mood congruent and intensity is flat  Speech:  clear, coherent and normal prosody  Psychomotor Behavior:  no evidence of tardive dyskinesia,  "dystonia, or tics and intact station, gait and muscle tone  Thought Process:  logical, linear and goal oriented  Associations:  no loose associations  Thought Content:  no evidence of suicidal ideation or homicidal ideation and no evidence of psychotic thought  Insight:  fair  Judgment:  fair  Oriented to:  time, person, and place  Attention Span and Concentration:  fair  Recent and Remote Memory:  intact  Language: Able to name objects, Able to repeat phrases and Able to read and write  Fund of Knowledge: appropriate  Muscle Strength and Tone: normal  Gait and Station: Normal          Labs/Vitals:   /74   Pulse 71   Temp 98.5  F (36.9  C) (Oral)   Resp 17   Ht 1.702 m (5' 7\")   Wt 81.4 kg (179 lb 8 oz)   SpO2 99%   BMI 28.11 kg/m    No results found for this or any previous visit (from the past 24 hour(s)).    Impression:   Jermain Felton is a 34-year-old single father of none who identifies as pickard, who broke up with his partner a week ago and has since been experiencing a significant escalation in his depressive symptoms.  He increased his antidepressant dose but did not obtain any desired benefit and could not guarantee safety, hence his presentation to the hospital for stabilization.       DIagnoses:     1.  Major depressive disorder, recurrent, severe without psychotic features.   2.  Adjustment disorder with mixed anxiety and depressed mood.   3.  Dependent personality disorder.   4.  History of cerebrovascular accident.   5.  Migraines.   6.  Asymptomatic human immunodeficiency virus-positive.   7.  Hyperlipidemia.            Plan:   1. Written information given on medications. Side effects, risks, benefits reviewed.  2. Medication changes: Vistaril 50 mg p.o. nightly.  3. Legal Status: Voluntary  4.  Continue hospitalization on stepdown unit.  For discharge tomorrow.      Attestation:  Patient has been seen and evaluated by me, Nadeem Clarke MD today.    PATIENT ID  Name: Rajat" Purnima  MRN:[id number]  YOB: 1985

## 2020-03-31 NOTE — PROGRESS NOTES
met with patient this morning to discuss options for aftercare. He is open to both a psychiatry referral as well as a referral for DBT. Patient signed release of information forms for [facility] in [place] for medication management and [facility] in [place] for DBT.  went over the safety plan with patient and encouraged him to complete it. Patient denies any thoughts of suicide or self harm at this time.

## 2020-04-01 VITALS
WEIGHT: 179.5 LBS | HEART RATE: 79 BPM | BODY MASS INDEX: 28.17 KG/M2 | TEMPERATURE: 98.5 F | RESPIRATION RATE: 16 BRPM | HEIGHT: 67 IN | OXYGEN SATURATION: 98 % | DIASTOLIC BLOOD PRESSURE: 74 MMHG | SYSTOLIC BLOOD PRESSURE: 120 MMHG

## 2020-04-01 PROCEDURE — 25000132 ZZH RX MED GY IP 250 OP 250 PS 637: Performed by: PSYCHIATRY & NEUROLOGY

## 2020-04-01 RX ORDER — CITALOPRAM HYDROBROMIDE 40 MG/1
40 TABLET ORAL DAILY
Qty: 30 TABLET | Refills: 0 | Status: SHIPPED | OUTPATIENT
Start: 2020-04-02 | End: 2020-04-01

## 2020-04-01 RX ORDER — ARIPIPRAZOLE 2 MG/1
2 TABLET ORAL DAILY
Qty: 30 TABLET | Refills: 0 | Status: SHIPPED | OUTPATIENT
Start: 2020-04-02 | End: 2020-05-02

## 2020-04-01 RX ORDER — ARIPIPRAZOLE 2 MG/1
2 TABLET ORAL DAILY
Qty: 30 TABLET | Refills: 0 | Status: SHIPPED | OUTPATIENT
Start: 2020-04-02 | End: 2020-04-01

## 2020-04-01 RX ORDER — CITALOPRAM HYDROBROMIDE 40 MG/1
40 TABLET ORAL DAILY
Qty: 30 TABLET | Refills: 0 | Status: SHIPPED | OUTPATIENT
Start: 2020-04-02 | End: 2020-05-02

## 2020-04-01 RX ORDER — HYDROXYZINE HYDROCHLORIDE 50 MG/1
50 TABLET, FILM COATED ORAL AT BEDTIME
Qty: 30 TABLET | Refills: 0 | Status: SHIPPED | OUTPATIENT
Start: 2020-04-01 | End: 2020-05-01

## 2020-04-01 RX ORDER — HYDROXYZINE HYDROCHLORIDE 50 MG/1
50 TABLET, FILM COATED ORAL AT BEDTIME
Qty: 3 TABLET | Refills: 0 | Status: SHIPPED | OUTPATIENT
Start: 2020-04-01 | End: 2020-04-01

## 2020-04-01 RX ADMIN — CITALOPRAM HYDROBROMIDE 40 MG: 20 TABLET ORAL at 08:19

## 2020-04-01 RX ADMIN — ARIPIPRAZOLE 2 MG: 2 TABLET ORAL at 08:19

## 2020-04-01 NOTE — PLAN OF CARE
Reviewed AVS/discharge medication with patient. Answered his questions. He verbalized understanding of discharge plan. He denies SI or self harm intent. He discharged to self
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
## 2020-04-01 NOTE — PLAN OF CARE
"Shift Update: Pt mood is calm, affect is depressed. Thought process is intact. Insight is appropriate. Pt denies suicidal ideation, was med and food compliant, attended a group. Pt reported that he has been having a decent day, and felt \"sad here and there. Nostalgic.\" Pt spent half of the shift sleeping since he did not have a good sleep the previous night.  "

## 2021-01-04 ENCOUNTER — HEALTH MAINTENANCE LETTER (OUTPATIENT)
Age: 36
End: 2021-01-04

## 2021-10-10 ENCOUNTER — HEALTH MAINTENANCE LETTER (OUTPATIENT)
Age: 36
End: 2021-10-10

## 2022-01-30 ENCOUNTER — HEALTH MAINTENANCE LETTER (OUTPATIENT)
Age: 37
End: 2022-01-30

## 2022-09-24 ENCOUNTER — HEALTH MAINTENANCE LETTER (OUTPATIENT)
Age: 37
End: 2022-09-24

## 2023-05-08 ENCOUNTER — HEALTH MAINTENANCE LETTER (OUTPATIENT)
Age: 38
End: 2023-05-08